# Patient Record
Sex: MALE | Race: ASIAN | Employment: UNEMPLOYED | URBAN - METROPOLITAN AREA
[De-identification: names, ages, dates, MRNs, and addresses within clinical notes are randomized per-mention and may not be internally consistent; named-entity substitution may affect disease eponyms.]

---

## 2023-09-11 ENCOUNTER — OFFICE VISIT (OUTPATIENT)
Age: 38
End: 2023-09-11

## 2023-09-11 VITALS
WEIGHT: 191.4 LBS | DIASTOLIC BLOOD PRESSURE: 64 MMHG | SYSTOLIC BLOOD PRESSURE: 120 MMHG | TEMPERATURE: 98.5 F | HEART RATE: 75 BPM | OXYGEN SATURATION: 96 % | RESPIRATION RATE: 20 BRPM

## 2023-09-11 DIAGNOSIS — L73.9 FOLLICULITIS: Primary | ICD-10-CM

## 2023-09-11 RX ORDER — CEPHALEXIN 500 MG/1
500 CAPSULE ORAL 4 TIMES DAILY
Qty: 40 CAPSULE | Refills: 0 | Status: SHIPPED | OUTPATIENT
Start: 2023-09-11 | End: 2023-09-21

## 2024-01-10 ENCOUNTER — OFFICE VISIT (OUTPATIENT)
Age: 39
End: 2024-01-10

## 2024-01-10 VITALS
HEART RATE: 72 BPM | DIASTOLIC BLOOD PRESSURE: 73 MMHG | SYSTOLIC BLOOD PRESSURE: 121 MMHG | TEMPERATURE: 97.7 F | RESPIRATION RATE: 18 BRPM | BODY MASS INDEX: 31.5 KG/M2 | OXYGEN SATURATION: 97 % | HEIGHT: 66 IN | WEIGHT: 196 LBS

## 2024-01-10 DIAGNOSIS — L73.9 FOLLICULITIS: ICD-10-CM

## 2024-01-10 DIAGNOSIS — Z13.29 SCREENING FOR THYROID DISORDER: ICD-10-CM

## 2024-01-10 DIAGNOSIS — Z76.89 ESTABLISHING CARE WITH NEW DOCTOR, ENCOUNTER FOR: Primary | ICD-10-CM

## 2024-01-10 DIAGNOSIS — R68.82 DECREASED LIBIDO: ICD-10-CM

## 2024-01-10 DIAGNOSIS — Z00.00 VISIT FOR WELL MAN HEALTH CHECK: ICD-10-CM

## 2024-01-10 DIAGNOSIS — E66.09 CLASS 1 OBESITY DUE TO EXCESS CALORIES WITHOUT SERIOUS COMORBIDITY WITH BODY MASS INDEX (BMI) OF 31.0 TO 31.9 IN ADULT: ICD-10-CM

## 2024-01-10 DIAGNOSIS — K21.9 GASTROESOPHAGEAL REFLUX DISEASE WITHOUT ESOPHAGITIS: ICD-10-CM

## 2024-01-10 DIAGNOSIS — Z13.1 SCREENING FOR DIABETES MELLITUS: ICD-10-CM

## 2024-01-10 DIAGNOSIS — Z13.220 SCREENING CHOLESTEROL LEVEL: ICD-10-CM

## 2024-01-10 DIAGNOSIS — R68.89 HEAT INTOLERANCE: ICD-10-CM

## 2024-01-10 DIAGNOSIS — F10.10 ALCOHOL CONSUMPTION BINGE DRINKING: ICD-10-CM

## 2024-01-10 LAB
BASOPHILS # BLD: 0 K/UL (ref 0–0.1)
BASOPHILS NFR BLD: 0 % (ref 0–1)
DIFFERENTIAL METHOD BLD: NORMAL
EOSINOPHIL # BLD: 0.3 K/UL (ref 0–0.4)
EOSINOPHIL NFR BLD: 4 % (ref 0–7)
ERYTHROCYTE [DISTWIDTH] IN BLOOD BY AUTOMATED COUNT: 13.3 % (ref 11.5–14.5)
HCT VFR BLD AUTO: 41.3 % (ref 36.6–50.3)
HGB BLD-MCNC: 13.4 G/DL (ref 12.1–17)
IMM GRANULOCYTES # BLD AUTO: 0 K/UL (ref 0–0.04)
IMM GRANULOCYTES NFR BLD AUTO: 0 % (ref 0–0.5)
LYMPHOCYTES # BLD: 1.8 K/UL (ref 0.8–3.5)
LYMPHOCYTES NFR BLD: 25 % (ref 12–49)
MCH RBC QN AUTO: 26.4 PG (ref 26–34)
MCHC RBC AUTO-ENTMCNC: 32.4 G/DL (ref 30–36.5)
MCV RBC AUTO: 81.3 FL (ref 80–99)
MONOCYTES # BLD: 0.4 K/UL (ref 0–1)
MONOCYTES NFR BLD: 6 % (ref 5–13)
NEUTS SEG # BLD: 4.5 K/UL (ref 1.8–8)
NEUTS SEG NFR BLD: 65 % (ref 32–75)
NRBC # BLD: 0 K/UL (ref 0–0.01)
NRBC BLD-RTO: 0 PER 100 WBC
PLATELET # BLD AUTO: 253 K/UL (ref 150–400)
PMV BLD AUTO: 10.6 FL (ref 8.9–12.9)
RBC # BLD AUTO: 5.08 M/UL (ref 4.1–5.7)
WBC # BLD AUTO: 7 K/UL (ref 4.1–11.1)

## 2024-01-10 PROCEDURE — 99204 OFFICE O/P NEW MOD 45 MIN: CPT | Performed by: STUDENT IN AN ORGANIZED HEALTH CARE EDUCATION/TRAINING PROGRAM

## 2024-01-10 PROCEDURE — 99385 PREV VISIT NEW AGE 18-39: CPT | Performed by: STUDENT IN AN ORGANIZED HEALTH CARE EDUCATION/TRAINING PROGRAM

## 2024-01-10 RX ORDER — DOXYCYCLINE HYCLATE 100 MG
100 TABLET ORAL 2 TIMES DAILY
Qty: 60 TABLET | Refills: 1 | Status: SHIPPED | OUTPATIENT
Start: 2024-01-10

## 2024-01-10 ASSESSMENT — PATIENT HEALTH QUESTIONNAIRE - PHQ9
SUM OF ALL RESPONSES TO PHQ QUESTIONS 1-9: 0
1. LITTLE INTEREST OR PLEASURE IN DOING THINGS: 0
SUM OF ALL RESPONSES TO PHQ QUESTIONS 1-9: 0
SUM OF ALL RESPONSES TO PHQ9 QUESTIONS 1 & 2: 0
2. FEELING DOWN, DEPRESSED OR HOPELESS: 0

## 2024-01-10 ASSESSMENT — ENCOUNTER SYMPTOMS
VOMITING: 0
ABDOMINAL PAIN: 0
BLOOD IN STOOL: 0
CONSTIPATION: 0
SHORTNESS OF BREATH: 0
DIARRHEA: 0
COUGH: 0
NAUSEA: 0

## 2024-01-10 NOTE — PROGRESS NOTES
Identified pt with two pt identifiers(name and ). Reviewed record in preparation for visit and have obtained necessary documentation. All patient medications has been reviewed.  Chief Complaint   Patient presents with    New Patient     Patient stated he get pimple in his he and neck area which cause hair lost after he pops it, he also mention heartburn . He also stated he has a \"hot feeling in his hands and feet \"             Health Maintenance Due   Topic    Hepatitis B vaccine (1 of 3 - 3-dose series)    COVID-19 Vaccine (1)    Varicella vaccine (1 of 2 - 2-dose childhood series)    Depression Screen     HIV screen     Hepatitis C screen     DTaP/Tdap/Td vaccine (1 - Tdap)    Flu vaccine (1)     Health Maintenance Review: Patient reminded of \"due or due soon\" health maintenance. I have asked the patient to contact his/her primary care provider (PCP) for follow-up on his/her health maintenance.        Wt Readings from Last 3 Encounters:   01/10/24 88.9 kg (196 lb)   23 86.8 kg (191 lb 6.4 oz)     Temp Readings from Last 3 Encounters:   01/10/24 97.7 °F (36.5 °C) (Oral)   23 98.5 °F (36.9 °C) (Oral)     BP Readings from Last 3 Encounters:   01/10/24 121/73   23 120/64     Pulse Readings from Last 3 Encounters:   01/10/24 72   23 75       1. \"Have you been to the ER, urgent care clinic since your last visit?  Hospitalized since your last visit?\" No    2. \"Have you seen or consulted any other health care providers outside of the [facility] since your last visit?\" No     3. For patients aged 45-75: Has the patient had a colonoscopy / FIT/ Cologuard? NA - based on age      If the patient is female:    4. For patients aged 40-74: Has the patient had a mammogram within the past 2 years? NA - based on age or sex      5. For patients aged 21-65: Has the patient had a pap smear? NA - based on age or sex        
Disp-60 tablet, R-1Normal  4. Heat intolerance  -     TSH; Future  5. Alcohol consumption binge drinking  6. Decreased libido  7. Class 1 obesity due to excess calories without serious comorbidity with body mass index (BMI) of 31.0 to 31.9 in adult  8. Visit for well man health check  -     CBC with Auto Differential; Future  -     Comprehensive Metabolic Panel; Future  -     Lipid Panel; Future  -     TSH; Future  -     Hemoglobin A1C; Future  9. Screening for diabetes mellitus  -     Hemoglobin A1C; Future  10. Screening cholesterol level  -     Lipid Panel; Future  11. Screening for thyroid disorder  -     TSH; Future         Patient presents for establish care visit with me.  Acute concerns addressed.  Chronic problems reviewed.  Medications and history reviewed.  See below for additional information.  GERD: Chronic and stable. Continue OTC meds as needed.  Folliculitis: Chronic and uncontrolled. Will give additional course of doxycycline - patient to take 1 tablet twice a day for 5-7 days and save other tablets for additional flares. Recommended dietary changes, limiting red-meat consumption and anti-inflammatory diet such as mediterranean diet.  Heat intolerance at night: Recommended patient keep core body cooler as this would lead to his hands and feet being less warm. Will check thyroid to rule out thyroid disorder.  Alcohol binge drinking - patient drinks 1/week but 5-6 drinks at a time. Recommended cutting down amount he drinks at one time  Decreased libido: Chronic, likely multifactorial - discussed dietary and lifestyle changes. Recommended decreasing red meat consumption and adopt a more plant-based diet. Follow-up in 3 months.  Obesity: Chronic and stable. BMI is 31.6. Patient does lift weights, but does not do aerobic exercises - recommended doing aerobic exercises with usual routine. Dietary changes reviewed - recommend cutting down caloric intake. Follow-up in 3 months  WME was completed today  -

## 2024-01-11 LAB
ALBUMIN SERPL-MCNC: 3.9 G/DL (ref 3.5–5)
ALBUMIN/GLOB SERPL: 1 (ref 1.1–2.2)
ALP SERPL-CCNC: 56 U/L (ref 45–117)
ALT SERPL-CCNC: 89 U/L (ref 12–78)
ANION GAP SERPL CALC-SCNC: 6 MMOL/L (ref 5–15)
AST SERPL-CCNC: 35 U/L (ref 15–37)
BILIRUB SERPL-MCNC: 0.5 MG/DL (ref 0.2–1)
BUN SERPL-MCNC: 13 MG/DL (ref 6–20)
BUN/CREAT SERPL: 18 (ref 12–20)
CALCIUM SERPL-MCNC: 9.4 MG/DL (ref 8.5–10.1)
CHLORIDE SERPL-SCNC: 110 MMOL/L (ref 97–108)
CHOLEST SERPL-MCNC: 183 MG/DL
CO2 SERPL-SCNC: 26 MMOL/L (ref 21–32)
CREAT SERPL-MCNC: 0.73 MG/DL (ref 0.7–1.3)
EST. AVERAGE GLUCOSE BLD GHB EST-MCNC: 111 MG/DL
GLOBULIN SER CALC-MCNC: 3.8 G/DL (ref 2–4)
GLUCOSE SERPL-MCNC: 92 MG/DL (ref 65–100)
HBA1C MFR BLD: 5.5 % (ref 4–5.6)
HDLC SERPL-MCNC: 35 MG/DL
HDLC SERPL: 5.2 (ref 0–5)
LDLC SERPL CALC-MCNC: 114.2 MG/DL (ref 0–100)
POTASSIUM SERPL-SCNC: 3.6 MMOL/L (ref 3.5–5.1)
PROT SERPL-MCNC: 7.7 G/DL (ref 6.4–8.2)
SODIUM SERPL-SCNC: 142 MMOL/L (ref 136–145)
TRIGL SERPL-MCNC: 169 MG/DL
TSH SERPL DL<=0.05 MIU/L-ACNC: 1.74 UIU/ML (ref 0.36–3.74)
VLDLC SERPL CALC-MCNC: 33.8 MG/DL

## 2024-03-06 DIAGNOSIS — L73.9 FOLLICULITIS: ICD-10-CM

## 2024-03-07 RX ORDER — DOXYCYCLINE HYCLATE 100 MG
100 TABLET ORAL 2 TIMES DAILY
Qty: 60 TABLET | Refills: 2 | Status: SHIPPED | OUTPATIENT
Start: 2024-03-07

## 2024-04-24 DIAGNOSIS — L73.9 FOLLICULITIS: ICD-10-CM

## 2024-04-24 DIAGNOSIS — L73.9 FOLLICULITIS: Primary | ICD-10-CM

## 2024-04-24 RX ORDER — DOXYCYCLINE HYCLATE 100 MG
100 TABLET ORAL 2 TIMES DAILY
Qty: 60 TABLET | Refills: 2 | OUTPATIENT
Start: 2024-04-24

## 2024-04-24 RX ORDER — DOXYCYCLINE HYCLATE 100 MG/1
100 CAPSULE ORAL 2 TIMES DAILY
Qty: 60 CAPSULE | Refills: 5 | Status: SHIPPED | OUTPATIENT
Start: 2024-04-24

## 2024-07-08 DIAGNOSIS — L73.9 FOLLICULITIS: ICD-10-CM

## 2024-07-08 RX ORDER — DOXYCYCLINE 100 MG/1
100 CAPSULE ORAL 2 TIMES DAILY
Qty: 60 CAPSULE | Refills: 5 | Status: CANCELLED | OUTPATIENT
Start: 2024-07-08

## 2024-07-08 NOTE — TELEPHONE ENCOUNTER
Pt requested refill(s) via [employer]     Duplicate request:   04/24/2024 Vibramycin 100 mg caps #60 with 5 refills was sent to [id number] with 5 refills.     For Pharmacy Admin Tracking Only    Program: Medication Refill  Intervention Detail: Discontinued Rx: 1, reason: Duplicate Therapy  Time Spent (min): 5    Requested Prescriptions     Pending Prescriptions Disp Refills    doxycycline hyclate (VIBRAMYCIN) 100 MG capsule 60 capsule 5     Sig: Take 1 capsule by mouth 2 times daily

## 2024-07-09 DIAGNOSIS — L73.9 FOLLICULITIS: ICD-10-CM

## 2024-07-09 RX ORDER — DOXYCYCLINE 100 MG/1
100 CAPSULE ORAL 2 TIMES DAILY
Qty: 60 CAPSULE | Refills: 5 | Status: CANCELLED | OUTPATIENT
Start: 2024-07-09

## 2024-07-10 DIAGNOSIS — L73.9 FOLLICULITIS: ICD-10-CM

## 2024-07-10 RX ORDER — DOXYCYCLINE HYCLATE 100 MG/1
100 CAPSULE ORAL 2 TIMES DAILY
Qty: 60 CAPSULE | Refills: 5 | Status: SHIPPED | OUTPATIENT
Start: 2024-07-10

## 2024-07-10 NOTE — TELEPHONE ENCOUNTER
Refills have been requested for the following medications:         doxycycline hyclate (VIBRAMYCIN) 100 MG capsule [Dr. Farhan Page MD]     Preferred pharmacy: [id number] - [address] [address] [phone] - F [phone]     PCP: Farhan Page MD     Last appt: 1/10/2024    Future Appointments   Date Time Provider Department Center   7/15/2024 10:00 AM Farhan Page MD [facility]          Requested Prescriptions     Pending Prescriptions Disp Refills    doxycycline hyclate (VIBRAMYCIN) 100 MG capsule 60 capsule 5     Sig: Take 1 capsule by mouth 2 times daily

## 2024-07-15 ENCOUNTER — OFFICE VISIT (OUTPATIENT)
Age: 39
End: 2024-07-15
Payer: MEDICAID

## 2024-07-15 VITALS
HEART RATE: 66 BPM | OXYGEN SATURATION: 98 % | DIASTOLIC BLOOD PRESSURE: 80 MMHG | BODY MASS INDEX: 31.92 KG/M2 | HEIGHT: 66 IN | RESPIRATION RATE: 16 BRPM | WEIGHT: 198.6 LBS | SYSTOLIC BLOOD PRESSURE: 127 MMHG | TEMPERATURE: 97.6 F

## 2024-07-15 DIAGNOSIS — Z23 ENCOUNTER FOR IMMUNIZATION: ICD-10-CM

## 2024-07-15 DIAGNOSIS — K21.9 GASTROESOPHAGEAL REFLUX DISEASE WITHOUT ESOPHAGITIS: ICD-10-CM

## 2024-07-15 DIAGNOSIS — R79.89 ELEVATED LFTS: ICD-10-CM

## 2024-07-15 DIAGNOSIS — L73.9 FOLLICULITIS: ICD-10-CM

## 2024-07-15 DIAGNOSIS — E66.09 CLASS 1 OBESITY DUE TO EXCESS CALORIES WITHOUT SERIOUS COMORBIDITY WITH BODY MASS INDEX (BMI) OF 31.0 TO 31.9 IN ADULT: ICD-10-CM

## 2024-07-15 PROCEDURE — 99214 OFFICE O/P EST MOD 30 MIN: CPT | Performed by: STUDENT IN AN ORGANIZED HEALTH CARE EDUCATION/TRAINING PROGRAM

## 2024-07-15 PROCEDURE — 90651 9VHPV VACCINE 2/3 DOSE IM: CPT | Performed by: STUDENT IN AN ORGANIZED HEALTH CARE EDUCATION/TRAINING PROGRAM

## 2024-07-15 PROCEDURE — PBSHW HPV, GARDASIL 9, (AGE 9-45 YRS), IM: Performed by: STUDENT IN AN ORGANIZED HEALTH CARE EDUCATION/TRAINING PROGRAM

## 2024-07-15 SDOH — ECONOMIC STABILITY: HOUSING INSECURITY
IN THE LAST 12 MONTHS, WAS THERE A TIME WHEN YOU DID NOT HAVE A STEADY PLACE TO SLEEP OR SLEPT IN A SHELTER (INCLUDING NOW)?: NO

## 2024-07-15 SDOH — ECONOMIC STABILITY: INCOME INSECURITY: HOW HARD IS IT FOR YOU TO PAY FOR THE VERY BASICS LIKE FOOD, HOUSING, MEDICAL CARE, AND HEATING?: NOT HARD AT ALL

## 2024-07-15 SDOH — ECONOMIC STABILITY: FOOD INSECURITY: WITHIN THE PAST 12 MONTHS, THE FOOD YOU BOUGHT JUST DIDN'T LAST AND YOU DIDN'T HAVE MONEY TO GET MORE.: NEVER TRUE

## 2024-07-15 SDOH — ECONOMIC STABILITY: FOOD INSECURITY: WITHIN THE PAST 12 MONTHS, YOU WORRIED THAT YOUR FOOD WOULD RUN OUT BEFORE YOU GOT MONEY TO BUY MORE.: NEVER TRUE

## 2024-07-15 ASSESSMENT — ENCOUNTER SYMPTOMS
VOMITING: 0
NAUSEA: 0
COUGH: 0
SHORTNESS OF BREATH: 0
BLOOD IN STOOL: 0
CONSTIPATION: 0
DIARRHEA: 0
ABDOMINAL PAIN: 0

## 2024-07-15 ASSESSMENT — PATIENT HEALTH QUESTIONNAIRE - PHQ9
2. FEELING DOWN, DEPRESSED OR HOPELESS: NOT AT ALL
SUM OF ALL RESPONSES TO PHQ QUESTIONS 1-9: 0
SUM OF ALL RESPONSES TO PHQ9 QUESTIONS 1 & 2: 0
1. LITTLE INTEREST OR PLEASURE IN DOING THINGS: NOT AT ALL

## 2024-07-15 NOTE — PATIENT INSTRUCTIONS
For fatty liver disease, I recommend dietary changes. Here is a great online resource for what to eat with fatty liver disease:   [email or web address]-guide-[email or web address]-what-and-how-to-eat-non-alcoholic-fatty-liver-disease.pdf  I recommend reading through this and making some of the recommended dietary changes.

## 2024-07-15 NOTE — PROGRESS NOTES
RM 13    Chief Complaint   Patient presents with    Follow-up     Discuss lab results        Vitals:    07/15/24 1005   BP: 127/80   Site: Left Upper Arm   Position: Sitting   Cuff Size: Medium Adult   Pulse: 66   Resp: 16   Temp: 97.6 °F (36.4 °C)   TempSrc: Oral   SpO2: 98%   Weight: 90.1 kg (198 lb 9.6 oz)   Height: 1.676 m (5' 6\")             No data to display                \"Have you been to the ER, urgent care clinic since your last visit?  Hospitalized since your last visit?\"    NO    “Have you seen or consulted any other health care providers outside of [facility] since your last visit?”    NO    After obtaining consent, and per orders of Dr. Page, injection of HPV given in Left deltoid by Anny De Leon LPN. Patient instructed to remain in clinic for 20 minutes afterwards, and to report any adverse reaction to me immediately.          Click Here for Release of Records Request   AVS  education, follow up, and recommendations provided and addressed with patient.  services used to advise patient No

## 2024-07-15 NOTE — PROGRESS NOTES
Kayla Dhaliwal (:  1985) is a 38 y.o. male, Established patient, here for evaluation of the following chief complaint(s):  Follow-up (Discuss lab results )        Subjective   SUBJECTIVE/OBJECTIVE:  Patient presents today for follow-up for the following:    Chronic problems:  GERD - Uses antacids which works  Folliculitis - Chronic and uncontrolled. Doxycycline has helped which he continues to use. Wants referral to dermatology.  Elevated LFTs - noted on prior lab testing. No known liver disease per patient.     Preventative care:  There are no preventive care reminders to display for this patient.         Past Medical History:   Diagnosis Date    Folliculitis     GERD (gastroesophageal reflux disease)      History reviewed. No pertinent surgical history.   Family History   Problem Relation Age of Onset    Asthma Mother     Pancreatic Cancer Father     No Known Problems Sister     No Known Problems Brother      Social History     Socioeconomic History    Marital status:      Spouse name: Not on file    Number of children: Not on file    Years of education: Not on file    Highest education level: Not on file   Occupational History    Not on file   Tobacco Use    Smoking status: Never     Passive exposure: Never    Smokeless tobacco: Never   Vaping Use    Vaping Use: Never used   Substance and Sexual Activity    Alcohol use: Yes     Alcohol/week: 6.0 standard drinks of alcohol     Types: 6 Standard drinks or equivalent per week    Drug use: Never    Sexual activity: Not on file   Other Topics Concern    Not on file   Social History Narrative    Not on file     Social Determinants of Health     Financial Resource Strain: Low Risk  (7/15/2024)    Overall Financial Resource Strain (CARDIA)     Difficulty of Paying Living Expenses: Not hard at all   Food Insecurity: No Food Insecurity (7/15/2024)    Hunger Vital Sign     Worried About Running Out of Food in the Last Year: Never true     Ran Out of Food

## 2024-08-19 PROBLEM — E78.2 MIXED HYPERLIPIDEMIA: Status: ACTIVE | Noted: 2024-08-19
